# Patient Record
Sex: MALE | Employment: FULL TIME | URBAN - METROPOLITAN AREA
[De-identification: names, ages, dates, MRNs, and addresses within clinical notes are randomized per-mention and may not be internally consistent; named-entity substitution may affect disease eponyms.]

---

## 2022-06-03 ENCOUNTER — OFFICE VISIT (OUTPATIENT)
Dept: ORTHOPEDIC SURGERY | Age: 52
End: 2022-06-03
Payer: COMMERCIAL

## 2022-06-03 VITALS — BODY MASS INDEX: 27.77 KG/M2 | WEIGHT: 240 LBS | HEIGHT: 78 IN

## 2022-06-03 DIAGNOSIS — G57.82 SAPHENOUS NEURALGIA, LEFT: ICD-10-CM

## 2022-06-03 DIAGNOSIS — M25.562 ACUTE PAIN OF LEFT KNEE: Primary | ICD-10-CM

## 2022-06-03 PROCEDURE — 99203 OFFICE O/P NEW LOW 30 MIN: CPT | Performed by: ORTHOPAEDIC SURGERY

## 2022-06-03 NOTE — PROGRESS NOTES
ASSESSMENT/PLAN:  Below is the assessment and plan developed based on review of pertinent history, physical exam, labs, studies, and medications. 1. Acute pain of left knee  -     XR KNEE LT MIN 4 V; Future  2. Saphenous neuralgia, left      Return if symptoms worsen or fail to improve. In discussion with the patient, we considered the numerus possible diagnoses that could be contributing to their present symptoms. We also deliberated on the extensive management options that must be considered to treat their current condition. We reviewed their accessible prior medical records, diagnostic tests, and current health and employment information. We considered how these symptoms were affecting the patient´s activities of daily living as well as employment and fitness activities. The patient had various questions regarding the possible risks, benefits, complications, morbidity and mortality regarding their diagnosis and treatment options. The patients´ comorbidities were considered, and I advocated that they consider maximizing lifestyle modification through nutrition and exercise to aid in addressing their symptoms. Shared decision making yielded an understanding to move forward with conservation treatment preferences. The patient expressed understanding that if conservative management fails to alleviate the present symptoms they will return to office for re-evaluation and consideration of additional diagnostic tests and potential surgical options. In the interim, we have recommended ice, elevation, and take prescription anti-inflammatory medications along with a physician directed home exercise program. We discussed the risks and common side effects of anti-inflammatory medications and instructed the patient to discontinue the medication and contact us if they experienced any side effects.  The patient was encouraged to discuss the possible side effects with their family physician or pharmacist prior to initiating any new medications. We discussed the fact that many of the recommended treatment options presented are significantly limited by the patient´s social determinants of health. We also reviewed the circumstances surrounding the environment that they live and work which affect a wide range of health risk. We considered the limited access to appropriate educational resources regarding proper nutrition and exercise as well as the economic and social support necessary to maintain health and wellbeing. We talked about the fact that he most likely has a neuroma from the infrapatellar branch of the saphenous nerve due to the trauma. We will start him on some topical Pennsaid. I did talk to him about the possibility of a subcutaneous injection of cortisone if he continued to have discomfort. We will try some activity modification prior to that. SUBJECTIVE/OBJECTIVE:  Scarlet Boas (: 1970) is a 46 y.o. male, patient,here for evaluation of the Knee Pain (left knee)  . The patient reports that he landed on his left knee 2 months ago on a hard surface. Since that time he has had sensitivity to the anterior aspect of his knee. Denies any swelling or bruising. He denies any numbness or tingling. He reports rest has made it better. Reports it bothers him with yoga as well as kneeling. Physical Exam    Upon physical examination, the patient is well developed, well nourished, alert and oriented times three, with normal mood and affect and walks with a normal gait. Examination left knee reveals he has full range of motion. There is no swelling. His knee is stable he has good strength. He is tender palpation over the anterior aspect of the knee. Negative Tinel's. He is neurovascular intact distally. Imagin views the knee show early evidence of patellofemoral arthritis bilaterally. No Known Allergies    No current outpatient medications on file.      No current facility-administered medications for this visit. History reviewed. No pertinent past medical history. History reviewed. No pertinent surgical history. History reviewed. No pertinent family history. Social History     Socioeconomic History    Marital status:      Spouse name: Not on file    Number of children: Not on file    Years of education: Not on file    Highest education level: Not on file   Occupational History    Not on file   Tobacco Use    Smoking status: Not on file    Smokeless tobacco: Not on file   Substance and Sexual Activity    Alcohol use: Not on file    Drug use: Not on file    Sexual activity: Not on file   Other Topics Concern    Not on file   Social History Narrative    Not on file     Social Determinants of Health     Financial Resource Strain:     Difficulty of Paying Living Expenses: Not on file   Food Insecurity:     Worried About Running Out of Food in the Last Year: Not on file    Rhett of Food in the Last Year: Not on file   Transportation Needs:     Lack of Transportation (Medical): Not on file    Lack of Transportation (Non-Medical):  Not on file   Physical Activity:     Days of Exercise per Week: Not on file    Minutes of Exercise per Session: Not on file   Stress:     Feeling of Stress : Not on file   Social Connections:     Frequency of Communication with Friends and Family: Not on file    Frequency of Social Gatherings with Friends and Family: Not on file    Attends Muslim Services: Not on file    Active Member of Clubs or Organizations: Not on file    Attends Club or Organization Meetings: Not on file    Marital Status: Not on file   Intimate Partner Violence:     Fear of Current or Ex-Partner: Not on file    Emotionally Abused: Not on file    Physically Abused: Not on file    Sexually Abused: Not on file   Housing Stability:     Unable to Pay for Housing in the Last Year: Not on file    Number of [place] in the Last Year: Not on file    Unstable Housing in the Last Year: Not on file       Review of Systems    No flowsheet data found. Vitals:  Ht 6' 6\" (1.981 m)   Wt 240 lb (108.9 kg)   BMI 27.73 kg/m²    Body mass index is 27.73 kg/m². An electronic signature was used to authenticate this note.   -- Alvarez Salas MD

## 2023-12-13 ENCOUNTER — TELEPHONE (OUTPATIENT)
Age: 53
End: 2023-12-13

## 2023-12-14 ENCOUNTER — PROCEDURE VISIT (OUTPATIENT)
Age: 53
End: 2023-12-14

## 2023-12-14 DIAGNOSIS — G62.9 PERIPHERAL POLYNEUROPATHY: Primary | ICD-10-CM

## 2023-12-14 NOTE — PROGRESS NOTES
[facility]  [address] [place] [address] Group  [address] [place] [address]  Phone [phone] Fax [phone]  Test Date:  2023    Patient: Michel Murray : 1970 Physician: Skyler Beach MD   Sex: Male Height: 6' 6\" Ref Phys: Scott Castle MD   ID#: [id number] Weight: 240 lbs. Technician: Yosvany Garcia     Patient Complaints:      Patient History / Exam:  68-year-old male who is being evaluated for numbness and tingling sensation in both feet for the past several years, suspected peripheral neuropathy. On exam: Alert and fully oriented. Cranial nerves II through XII intact muscle tone and bulk normal.  Strength normal in all extremities. DTR's 2/2 and symmetric ankle jerk hypoactive. Sensation impaired to pinprick and vibration sense distally in both feet with distal to proximal gradient. Romberg negative. Gait steady. NCV & EMG Findings:  Evaluation of the left peroneal motor nerve showed prolonged distal onset latency (7.7 ms), reduced amplitude (1.5 mV), and decreased conduction velocity (B Fib-Ankle, 34 m/s). The right peroneal motor nerve showed prolonged distal onset latency (8.6 ms), reduced amplitude (1.8 mV), decreased conduction velocity (B Fib-Ankle, 31 m/s), and decreased conduction velocity (Poplt-B Fib, 37 m/s). The left tibial motor nerve showed reduced amplitude (1.1 mV). The right tibial motor nerve showed prolonged distal onset latency (7.4 ms), reduced amplitude (1.1 mV), and decreased conduction velocity (Knee-Ankle, 33 m/s). The left Sup Peroneal sensory and the right Sup Peroneal sensory nerves showed no response (14 cm). The left sural sensory and the right sural sensory nerves showed no response (Calf). Left vs. Right side comparison data for the tibial motor nerve indicates abnormal L-R latency difference (2.0 ms).       F Wave studies indicate that the left tibial F wave has prolonged latency

## 2024-01-16 ENCOUNTER — OFFICE VISIT (OUTPATIENT)
Age: 54
End: 2024-01-16
Payer: COMMERCIAL

## 2024-01-16 VITALS
DIASTOLIC BLOOD PRESSURE: 72 MMHG | HEART RATE: 69 BPM | HEIGHT: 78 IN | SYSTOLIC BLOOD PRESSURE: 128 MMHG | OXYGEN SATURATION: 99 % | BODY MASS INDEX: 27.77 KG/M2 | WEIGHT: 240 LBS | RESPIRATION RATE: 17 BRPM

## 2024-01-16 DIAGNOSIS — R20.2 NUMBNESS AND TINGLING OF BOTH FEET: ICD-10-CM

## 2024-01-16 DIAGNOSIS — R20.0 NUMBNESS AND TINGLING OF BOTH FEET: ICD-10-CM

## 2024-01-16 DIAGNOSIS — G62.9 PERIPHERAL POLYNEUROPATHY: Primary | ICD-10-CM

## 2024-01-16 PROCEDURE — 99215 OFFICE O/P EST HI 40 MIN: CPT

## 2024-01-16 RX ORDER — MULTIVIT-MIN/IRON FUM/FOLIC AC 7.5 MG-4
1 TABLET ORAL DAILY
COMMUNITY

## 2024-01-16 ASSESSMENT — PATIENT HEALTH QUESTIONNAIRE - PHQ9
SUM OF ALL RESPONSES TO PHQ9 QUESTIONS 1 & 2: 0
SUM OF ALL RESPONSES TO PHQ QUESTIONS 1-9: 0
2. FEELING DOWN, DEPRESSED OR HOPELESS: 0
1. LITTLE INTEREST OR PLEASURE IN DOING THINGS: 0
SUM OF ALL RESPONSES TO PHQ QUESTIONS 1-9: 0

## 2024-01-16 ASSESSMENT — ENCOUNTER SYMPTOMS: BACK PAIN: 1

## 2024-01-16 NOTE — PROGRESS NOTES
Not on file   Tobacco Use    Smoking status: Never    Smokeless tobacco: Not on file   Vaping Use    Vaping Use: Never used   Substance and Sexual Activity    Alcohol use: Not on file    Drug use: Never    Sexual activity: Not on file   Other Topics Concern    Not on file   Social History Narrative    Not on file     Social Determinants of Health     Financial Resource Strain: Not on file   Food Insecurity: Not on file   Transportation Needs: Not on file   Physical Activity: Not on file   Stress: Not on file   Social Connections: Not on file   Intimate Partner Violence: Not on file   Housing Stability: Not on file     No Known Allergies      Current Outpatient Medications   Medication Sig    Multiple Vitamins-Minerals (MULTIVITAMIN WITH MINERALS) tablet Take 1 tablet by mouth daily     No current facility-administered medications for this visit.           Neurologic Exam     Mental Status   Oriented to person, place, and time.   Speech: speech is normal   Level of consciousness: alert    Cranial Nerves   Cranial nerves II through XII intact.     CN III, IV, VI   Pupils are equal, round, and reactive to light.    CN VII   Facial expression full, symmetric.     Motor Exam   Muscle bulk: normal    Strength   Strength 5/5 throughout.     Sensory Exam   Right leg light touch: decreased from knee  Left leg light touch: decreased from knee  Right leg pinprick: decreased from knee  Left leg pinprick: decreased from knee    Gait, Coordination, and Reflexes     Gait  Gait: normal      /72 (Site: Left Upper Arm, Position: Sitting, Cuff Size: Large Adult)   Pulse 69   Resp 17   Ht 1.981 m (6' 6\")   Wt 108.9 kg (240 lb)   SpO2 99%   BMI 27.73 kg/m²       CT Results (maximum last 3):  CT Result (most recent):  No results found for this or any previous visit from the past 3650 days.        MRI Results (maximum last 3):  MRI Result (most recent):  No results found for this or any previous visit from the past 3650

## 2024-01-17 ENCOUNTER — TELEPHONE (OUTPATIENT)
Age: 54
End: 2024-01-17

## 2024-01-17 NOTE — TELEPHONE ENCOUNTER
Dr. Gaudencio Major's office with neurosurgery at [facility] is calling to request the patient's EMG results to be faxed to them at [phone].